# Patient Record
Sex: MALE | Race: WHITE | Employment: OTHER | URBAN - METROPOLITAN AREA
[De-identification: names, ages, dates, MRNs, and addresses within clinical notes are randomized per-mention and may not be internally consistent; named-entity substitution may affect disease eponyms.]

---

## 2019-05-08 ENCOUNTER — HOSPITAL ENCOUNTER (EMERGENCY)
Age: 84
Discharge: HOME | End: 2019-05-08
Attending: EMERGENCY MEDICINE
Payer: MEDICARE

## 2019-05-08 VITALS
SYSTOLIC BLOOD PRESSURE: 144 MMHG | RESPIRATION RATE: 18 BRPM | OXYGEN SATURATION: 98 % | HEART RATE: 99 BPM | TEMPERATURE: 98 F | WEIGHT: 170 LBS | DIASTOLIC BLOOD PRESSURE: 93 MMHG

## 2019-05-08 DIAGNOSIS — Z00.00 ROUTINE MEDICAL EXAM: Primary | ICD-10-CM

## 2019-05-08 PROCEDURE — 99281 EMR DPT VST MAYX REQ PHY/QHP: CPT

## 2019-05-08 RX ORDER — ATENOLOL 25 MG/1
1 TABLET ORAL
COMMUNITY

## 2019-05-08 RX ORDER — CELECOXIB 50 MG/1
2 CAPSULE ORAL
COMMUNITY
Start: 2013-04-04

## 2019-05-08 RX ORDER — FLUOXETINE HYDROCHLORIDE 20 MG/1
20 CAPSULE ORAL
COMMUNITY
Start: 2019-05-06

## 2019-05-08 RX ORDER — LORAZEPAM 0.5 MG/1
TABLET ORAL
Refills: 0 | COMMUNITY
Start: 2019-04-30

## 2019-05-08 RX ORDER — MONTELUKAST SODIUM 10 MG/1
10 TABLET ORAL
COMMUNITY
Start: 2019-03-27

## 2019-05-08 RX ORDER — ROSUVASTATIN CALCIUM 40 MG/1
1 TABLET, COATED ORAL
COMMUNITY

## 2019-05-08 RX ORDER — EZETIMIBE 10 MG/1
1 TABLET ORAL
COMMUNITY
Start: 2007-06-27

## 2019-05-08 RX ORDER — METHYLPREDNISOLONE 4 MG
3 TABLET, DOSE PACK ORAL
COMMUNITY

## 2019-05-08 RX ORDER — CLOPIDOGREL BISULFATE 75 MG/1
TABLET ORAL
COMMUNITY
Start: 2007-07-02

## 2019-05-08 RX ORDER — ASPIRIN 81 MG/1
TABLET, CHEWABLE ORAL
Refills: 0 | COMMUNITY
Start: 2019-04-29

## 2019-05-08 RX ORDER — ESOMEPRAZOLE MAGNESIUM 40 MG/1
40 CAPSULE, DELAYED RELEASE ORAL
COMMUNITY
Start: 2007-07-02

## 2019-05-09 ASSESSMENT — ENCOUNTER SYMPTOMS
GASTROINTESTINAL NEGATIVE: 1
EYES NEGATIVE: 1
RESPIRATORY NEGATIVE: 1

## 2019-05-09 NOTE — ED NOTES
Pt instructed to follow up with [facility] for scheduling of blood draw. Pt and family verbalizes understanding of dc and follow up instructions.       Nicholas Rachel RN  05/08/19 1001

## 2019-05-09 NOTE — ED TRIAGE NOTES
Pt sts that he came with his niece to get \" a blood test.\" sts that he was told by her doctor to get a blood test about 2 weeks ago but came today since \"my family was already coming. \" unsure of specific test that was wanted. Family also does not know what pt needs drawn. Denies complaints.

## 2019-05-09 NOTE — ED PROVIDER NOTES
[address] [place]          ATTENDING PHYSICIAN NOTE       Date of evaluation: 5/8/2019    Chief Complaint     Blood Work (sent by PCP for [employer] test\")      History of Present Illness     Christo Fontana is a 80 y.o. male who presents to the emergency department requesting blood work. Patient states she was seen by his primary care provider 2 days ago who ordered blood work. He does not know which tested been ordered and it was through an outside hospital system. Patient was bringing a family member to the emergency department to have blood work drawn and thought he could do the same. Patient has no other complaints at this time. Review of Systems     Review of Systems   Constitutional: Negative. HENT: Negative. Eyes: Negative. Respiratory: Negative. Cardiovascular: Negative. Gastrointestinal: Negative. Genitourinary: Negative. Musculoskeletal: Negative. Neurological: Negative. All other systems reviewed and are negative. Past Medical, Surgical, Family, and Social History     He has no past medical history on file. He has no past surgical history on file. His family history is not on file.   He     Medications     Discharge Medication List as of 5/8/2019 10:58 PM      CONTINUE these medications which have NOT CHANGED    Details   celecoxib (CELEBREX) 50 MG capsule Take 2 capsules by mouthHistorical Med      clopidogrel (PLAVIX) 75 MG tablet take 1 tablet by oral route 1 time a day with foodHistorical Med      esomeprazole (NEXIUM) 40 MG delayed release capsule Take 40 mg by mouthHistorical Med      ezetimibe (ZETIA) 10 MG tablet Take 1 tablet by mouthHistorical Med      FLUoxetine (PROZAC) 20 MG capsule Take 20 mg by mouthHistorical Med      metFORMIN (GLUCOPHAGE) 500 MG tablet Take by mouthHistorical Med      montelukast (SINGULAIR) 10 MG tablet Take 10 mg by mouthHistorical Med      aspirin 81 MG chewable tablet CSW 1 T PO D, R-0Historical Med      atenolol (TENORMIN) 25 MG tablet Take 1 tablet by mouthHistorical Med      Glucosamine Sulfate 500 MG TABS Take 3 capsules by mouthHistorical Med      LORazepam (ATIVAN) 0.5 MG tablet TK 1 T PO Q 6 H PRN, R-0Historical Med      rosuvastatin (CRESTOR) 40 MG tablet Take 1 tablet by mouthHistorical Med             Allergies     He has No Known Allergies. Physical Exam     INITIAL VITALS: BP: (!) 144/93, Temp: 98 °F (36.7 °C), Pulse: 99, Resp: 18, SpO2: 98 %    Physical Exam   Constitutional: He appears well-developed and well-nourished. No distress. Nursing note and vitals reviewed. Diagnostic Results     RECENT VITALS:  BP: (!) 144/93,Temp: 98 °F (36.7 °C), Pulse: 99, Resp: 18, SpO2: 98 %     Procedures     N/A    ED Course     Nursing Notes, Past Medical Hx, Past Surgical Hx, Social Hx,Allergies, and Family Hx were reviewed. The patient was given the following medications:  No orders of the defined types were placed in this encounter. CONSULTS:  None    MEDICAL DECISIONMAKING / ASSESSMENT / PLAN     Megha Delvalle is a 80 y.o. male presents to the emergency department requesting blood work to be drawn. The patient has no complaints at this time. In review of records, the patient had several laboratory studies ordered including fasting lipids. Patient states he last ate 3 hours prior to presentation. When I made him aware we be unable to draw this lab for testing and he would hate to go twice the patient declined testing at this time and stated that he will get outpatient testing performed. I did instruct him to contact any lab within his primary care system so that they would have all the orders requested. Clinical Impression     1.  Routine medical exam        Disposition     PATIENT REFERRED TO:  Ghassan Dupree MD  [address] [place],[id number]  [address] [facility] [address]  [phone]            DISCHARGE MEDICATIONS:  Discharge Medication List as of 5/8/2019 10:58 PM